# Patient Record
Sex: FEMALE | Race: BLACK OR AFRICAN AMERICAN | Employment: FULL TIME | URBAN - METROPOLITAN AREA
[De-identification: names, ages, dates, MRNs, and addresses within clinical notes are randomized per-mention and may not be internally consistent; named-entity substitution may affect disease eponyms.]

---

## 2023-03-14 ENCOUNTER — TELEPHONE (OUTPATIENT)
Dept: SURGERY | Facility: CLINIC | Age: 28
End: 2023-03-14
Payer: COMMERCIAL

## 2023-03-14 NOTE — TELEPHONE ENCOUNTER
Progress Note      Patient Name: Kamini Orta   Patient ID: [id number]   Sex: Female   YOB: 1959    Primary Care Provider: Davin Hall MD   Referring Provider: Davin Hall MD    Visit Date: December 8, 2020    Provider: CAROLINE Cespedes   Location: [facility]   Location Address: [address] [place] [address]  [id number]   Location Phone: [phone]          Chief Complaint  · dysphagia       History Of Present Illness  Kamini Orta is a 60 year old /White female who presents to the office today.      Pt states she feels like her pills are getting stuck in upper/mid esophagus, ongoing for several months. THis also happens with solids a couple times/week. No pain w swallowing. NO HB w Nexium. No abd pain currently.   Pt states she's had diverticulitis several times, but nothing recently. NO constipation or diarrhea, takes Fiber daily.     Pt states her brother is in [place] w liver and colon cancer; pt is his POA.   Pt states she's under a lot of stress.     Last colonoscopy November 2019: Adequate prep, diverticula in the sigmoid, a 10 mm polyp removed from the ascending colonsessile serrated polyp, grade 1 internal hemorrhoids, 3 mm polyp in the sigmoid colon completely removedhyperplastic polyp    NO known cardio or pulmonary hx.       Past Medical History  Anxiety; Arthritis; Crohn's Disease; Diverticulitis; Pain, Leg; Pain, Lumbar; Rectocele         Past Surgical History  Cesarian Section; Cholecystectomy; Colonoscopy; EGD; Hysterectomy; Knee repair         Medication List  Name Date Started Instructions   diclofenac sodium 100 mg oral tablet extended release 24 hr  take 1 tablet (100 mg) by oral route once daily   estradiol 0.05 mg/24 hr transdermal patch semiweekly  apply 1 patch by transdermal route twice weekly   fluoxetine 40 mg oral capsule  take 1 capsule (40 mg) by oral route once daily in the evening   fluticasone propionate 50  Pt's appt for 03/15/23 cancelled. Pt is wanting a bariatric consult. Pt's information forwarded to Dr. Hernandez's bariatric clinic's nurse, Jessica. Pt notified and verbalized understanding.   mcg/actuation nasal spray,suspension  spray 1 spray (50 mcg) in each nostril by intranasal route once daily   hydrocodone-acetaminophen 5-325 mg oral tablet  take 1 tablet by oral route every 6 hours as needed for pain   levothyroxine 75 mcg oral tablet  take 1 tablet (75 mcg) by oral route once daily   Lortab 5-500 mg Oral Tablet  take 1 tablet by oral route every 6 hours as needed for pain   Nexium 40 mg Oral Capsule, Delayed Release(E.C.)  take 1 capsule (40 mg) by oral route once daily   Xanax 0.5 mg Oral Tablet  take 1 tablet (0.5 mg) by oral route 3 times per day         Allergy List  Codeine Phosphate; Codeine Sulfate; erythromycin         Family Medical History  Spine Problems; Mesothelioma; Family history of colon cancer         Social History  Alcohol (Former); Tobacco (Former)         Review of Systems  · Constitutional  o Admits  o : good general health lately, no acute distress  · Gastrointestinal  o Denies  o : additional gastrointestinal symptoms except as noted in the HPI  · Psychiatric  o Admits  o : pleasant affect      Physical Examination  · Constitutional  o Appearance  o : well developed, well-nourished, in no acute distress  · Head and Face  o Head  o :   § Inspection  § : atraumatic, normocephalic  · Eyes  o Sclerae  o : sclerae white, no sclerae icterus  · Neck  o Inspection/Palpation  o : supple  · Respiratory  o Respiratory Effort  o : breathing unlabored  o Inspection of Chest  o : normal appearance, no retractions  · Cardiovascular  o Peripheral Vascular System  o :   § Extremities  § : no cyanosis, clubbing or edema  · Gastrointestinal  o Abdominal Examination  o : soft, nontender to palpation  · Skin and Subcutaneous Tissue  o General Inspection  o : no lesions present, no rashes present  · Neurologic  o Mental Status Examination  o :   § Orientation  § : grossly oriented to person, place and time  § Speech/Language  § : communication ability within normal limits, voice quality normal,  articulation of speech normal, no evidence of aphasia  § Attention  § : attention normal, concentration abilities normal  o Sensation  o : grossly intact  o Gait and Station  o :   § Gait Screening  § : normal gait  · Psychiatric  o General  o : Alert and oriented x3  o Mood and Affect  o : Mood and affect are appropriate to circumstances              Assessment  · Pre-op exam     V72.84/Z01.818  · Dysphagia     787.20/R13.10  · Heartburn     787.1/R12  · History of colon polyps     V12.72/Z86.010      Plan  · Orders  o [facility] Pre-Op Covid-19 Screening (32143) - - 12/08/2020  · Instructions  o Please Sign Permit for: EGD  o Indication: dysphagia, HB (controlled w PPI)  o Surgical Risk and Benefits: Possible risks/complications, benefits, and alternatives to surgical or invasive procedure have been explained to patient and/or legal guardian; Patient has been evaluated and can tolerate anesthesia and/or sedation. Risks, benefits, and alternatives to anesthesia and sedation have been explained to patient and/or legal guardian.  o Follow Up after Procedure.  o Encouraged to follow-up with Primary Care Provider for preventative care.  o Patient was educated/instructed on their diagnosis, treatment and medications prior to discharge from the clinic today.  o Patient instructed to seek medical attention urgently for new or worsening symptoms.            Electronically Signed by: CAROLINE Cespedes -Author on December 8, 2020 03:01:37 PM